# Patient Record
Sex: MALE | Race: WHITE | NOT HISPANIC OR LATINO | Employment: FULL TIME | URBAN - METROPOLITAN AREA
[De-identification: names, ages, dates, MRNs, and addresses within clinical notes are randomized per-mention and may not be internally consistent; named-entity substitution may affect disease eponyms.]

---

## 2017-05-30 ENCOUNTER — HOSPITAL ENCOUNTER (EMERGENCY)
Facility: MEDICAL CENTER | Age: 27
End: 2017-05-30
Payer: COMMERCIAL

## 2017-05-30 ENCOUNTER — APPOINTMENT (OUTPATIENT)
Dept: ADMISSIONS | Facility: MEDICAL CENTER | Age: 27
End: 2017-05-30
Attending: OTOLARYNGOLOGY
Payer: COMMERCIAL

## 2017-05-30 NOTE — OR NURSING
preadmit appointment: Pt. Instructed to continue regularly prescribed medication through the day before surgery, instructed to take the following medications, the day of surgery, with a sip of water per anesthesia protocol  No meds:

## 2017-06-02 ENCOUNTER — HOSPITAL ENCOUNTER (OUTPATIENT)
Facility: MEDICAL CENTER | Age: 27
End: 2017-06-02
Attending: OTOLARYNGOLOGY | Admitting: OTOLARYNGOLOGY
Payer: COMMERCIAL

## 2017-06-02 VITALS
DIASTOLIC BLOOD PRESSURE: 67 MMHG | HEIGHT: 71 IN | BODY MASS INDEX: 24.17 KG/M2 | RESPIRATION RATE: 16 BRPM | OXYGEN SATURATION: 93 % | TEMPERATURE: 98.6 F | SYSTOLIC BLOOD PRESSURE: 101 MMHG | WEIGHT: 172.62 LBS

## 2017-06-02 LAB — PATHOLOGY CONSULT NOTE: NORMAL

## 2017-06-02 PROCEDURE — 700105 HCHG RX REV CODE 258: Performed by: OTOLARYNGOLOGY

## 2017-06-02 PROCEDURE — 160029 HCHG SURGERY MINUTES - 1ST 30 MINS LEVEL 4: Performed by: OTOLARYNGOLOGY

## 2017-06-02 PROCEDURE — 160009 HCHG ANES TIME/MIN: Performed by: OTOLARYNGOLOGY

## 2017-06-02 PROCEDURE — 500331 HCHG COTTONOID, SURG PATTIE: Performed by: OTOLARYNGOLOGY

## 2017-06-02 PROCEDURE — A9270 NON-COVERED ITEM OR SERVICE: HCPCS

## 2017-06-02 PROCEDURE — 160041 HCHG SURGERY MINUTES - EA ADDL 1 MIN LEVEL 4: Performed by: OTOLARYNGOLOGY

## 2017-06-02 PROCEDURE — 700101 HCHG RX REV CODE 250

## 2017-06-02 PROCEDURE — 160002 HCHG RECOVERY MINUTES (STAT): Performed by: OTOLARYNGOLOGY

## 2017-06-02 PROCEDURE — 160035 HCHG PACU - 1ST 60 MINS PHASE I: Performed by: OTOLARYNGOLOGY

## 2017-06-02 PROCEDURE — 501404 HCHG SPLINT, NASAL DOYLE AIRWAY: Performed by: OTOLARYNGOLOGY

## 2017-06-02 PROCEDURE — 160036 HCHG PACU - EA ADDL 30 MINS PHASE I: Performed by: OTOLARYNGOLOGY

## 2017-06-02 PROCEDURE — 88300 SURGICAL PATH GROSS: CPT

## 2017-06-02 PROCEDURE — 502240 HCHG MISC OR SUPPLY RC 0272: Performed by: OTOLARYNGOLOGY

## 2017-06-02 PROCEDURE — 160025 RECOVERY II MINUTES (STATS): Performed by: OTOLARYNGOLOGY

## 2017-06-02 PROCEDURE — 501838 HCHG SUTURE GENERAL: Performed by: OTOLARYNGOLOGY

## 2017-06-02 PROCEDURE — 160048 HCHG OR STATISTICAL LEVEL 1-5: Performed by: OTOLARYNGOLOGY

## 2017-06-02 PROCEDURE — 160046 HCHG PACU - 1ST 60 MINS PHASE II: Performed by: OTOLARYNGOLOGY

## 2017-06-02 PROCEDURE — 700102 HCHG RX REV CODE 250 W/ 637 OVERRIDE(OP)

## 2017-06-02 PROCEDURE — 501405 HCHG SPLINT, NASAL EXTERNAL: Performed by: OTOLARYNGOLOGY

## 2017-06-02 PROCEDURE — 700111 HCHG RX REV CODE 636 W/ 250 OVERRIDE (IP)

## 2017-06-02 RX ORDER — LIDOCAINE HYDROCHLORIDE AND EPINEPHRINE 10; 10 MG/ML; UG/ML
INJECTION, SOLUTION INFILTRATION; PERINEURAL
Status: DISCONTINUED | OUTPATIENT
Start: 2017-06-02 | End: 2017-06-02 | Stop reason: HOSPADM

## 2017-06-02 RX ORDER — OXYMETAZOLINE HYDROCHLORIDE 0.05 G/100ML
SPRAY NASAL
Status: DISCONTINUED | OUTPATIENT
Start: 2017-06-02 | End: 2017-06-02 | Stop reason: HOSPADM

## 2017-06-02 RX ORDER — OXYMETAZOLINE HYDROCHLORIDE 0.05 G/100ML
SPRAY NASAL
Status: COMPLETED
Start: 2017-06-02 | End: 2017-06-02

## 2017-06-02 RX ORDER — LIDOCAINE HYDROCHLORIDE 10 MG/ML
INJECTION, SOLUTION INFILTRATION; PERINEURAL
Status: COMPLETED
Start: 2017-06-02 | End: 2017-06-02

## 2017-06-02 RX ORDER — SODIUM CHLORIDE, SODIUM LACTATE, POTASSIUM CHLORIDE, CALCIUM CHLORIDE 600; 310; 30; 20 MG/100ML; MG/100ML; MG/100ML; MG/100ML
1000 INJECTION, SOLUTION INTRAVENOUS
Status: DISCONTINUED | OUTPATIENT
Start: 2017-06-02 | End: 2017-06-02 | Stop reason: HOSPADM

## 2017-06-02 RX ADMIN — LIDOCAINE HYDROCHLORIDE 0.2 ML: 10 INJECTION, SOLUTION INFILTRATION; PERINEURAL at 07:20

## 2017-06-02 RX ADMIN — SODIUM CHLORIDE, POTASSIUM CHLORIDE, SODIUM LACTATE AND CALCIUM CHLORIDE 1000 ML: 600; 310; 30; 20 INJECTION, SOLUTION INTRAVENOUS at 07:21

## 2017-06-02 RX ADMIN — OXYMETAZOLINE HYDROCHLORIDE 2 SPRAY: 5 SPRAY NASAL at 07:27

## 2017-06-02 ASSESSMENT — PAIN SCALES - GENERAL
PAINLEVEL_OUTOF10: 2
PAINLEVEL_OUTOF10: 0
PAINLEVEL_OUTOF10: 2
PAINLEVEL_OUTOF10: 1
PAINLEVEL_OUTOF10: 0

## 2017-06-02 NOTE — IP AVS SNAPSHOT
6/2/2017    Adarsh Parra  [address]  [facility] [address]    Dear Adarsh:    [facility] wants to ensure your discharge home is safe and you or your loved ones have had all of your questions answered regarding your care after you leave the hospital.    Below is a list of resources and contact information should you have any questions regarding your hospital stay, follow-up instructions, or active medical symptoms.    Questions or Concerns Regarding… Contact   Medical Questions Related to Your Discharge  (7 days a week, 8am-5pm) Contact a Nurse Care Coordinator   [phone]   Medical Questions Not Related to Your Discharge  (24 hours a day / 7 days a week)  Contact the Nurse Health Line   [phone]    Medications or Discharge Instructions Refer to your discharge packet   or contact your [facility] Primary Care Provider   [phone]   Follow-up Appointment(s) Schedule your appointment via [employer]   or contact Scheduling [phone]   Billing Review your statement via [employer]  or contact Billing [phone]   Medical Records Review your records via [employer]   or contact Medical Records [phone]     You may receive a telephone call within two days of discharge. This call is to make certain you understand your discharge instructions and have the opportunity to have any questions answered. You can also easily access your medical information, test results and upcoming appointments via the [employer] free online health management tool. You can learn more and sign up at [employer]. For assistance setting up your [employer] account, please call [phone].    Once again, we want to ensure your discharge home is safe and that you have a clear understanding of any next steps in your care. If you have any questions or concerns, please do not hesitate to contact us, we are here for you. Thank you for choosing [facility] for your healthcare needs.    Sincerely,    Your [facility] Healthcare Team

## 2017-06-02 NOTE — DISCHARGE INSTRUCTIONS
ACTIVITY: Rest and take it easy for the first 24 hours.  A responsible adult is recommended to remain with you during that time.  It is normal to feel sleepy.  We encourage you to not do anything that requires balance, judgment or coordination.    MILD FLU-LIKE SYMPTOMS ARE NORMAL. YOU MAY EXPERIENCE GENERALIZED MUSCLE ACHES, THROAT IRRITATION, HEADACHE AND/OR SOME NAUSEA.    FOR 24 HOURS DO NOT:  Drive, operate machinery or run household appliances.  Drink beer or alcoholic beverages.   Make important decisions or sign legal documents.    SPECIAL INSTRUCTIONS: Elevate head of bed 15-30 degrees (prop up on several pillows when resting or sleeping). Ice pack to nose/eyes 20 minutes on/20 minutes off. Do not blow nose.     DIET: To avoid nausea, slowly advance diet as tolerated, avoiding spicy or greasy foods for the first day.  Add more substantial food to your diet according to your physician's instructions. INCREASE FLUIDS AND FIBER TO AVOID CONSTIPATION.    SURGICAL DRESSING/BATHING: Changes nasal drip pad as needed.     FOLLOW-UP APPOINTMENT:  A follow-up appointment should be arranged with your doctor in office as instructed; call to schedule.    You should CALL YOUR PHYSICIAN if you develop:  Fever greater than 101 degrees F.  Pain not relieved by medication, or persistent nausea or vomiting.  Excessive bleeding (blood soaking through dressing) or unexpected drainage from the wound.  Extreme redness or swelling around the incision site, drainage of pus or foul smelling drainage.  Inability to urinate or empty your bladder within 8 hours.  Problems with breathing or chest pain.    You should call 911 if you develop problems with breathing or chest pain.  If you are unable to contact your doctor or surgical center, you should go to the nearest emergency room or urgent care center.  Dr Feng's telephone #: [phone]    If any questions arise, call your doctor.  If your doctor is not available, please feel free  to call the Surgical Center at [phone].  The Center is open Monday through Friday from 7AM to 7PM.  You can also call the HEALTH HOTLINE open 24 hours/day, 7 days/week and speak to a nurse at [phone], or toll free at [phone].    A registered nurse may call you a few days after your surgery to see how you are doing after your procedure.    MEDICATIONS: Resume taking daily medication.  Take prescribed pain medication with food.  If no medication is prescribed, you may take non-aspirin pain medication if needed.  PAIN MEDICATION CAN BE VERY CONSTIPATING.  Take a stool softener or laxative such as senokot, pericolace, or milk of magnesia if needed.    Prescription given for Percocet, Zofran and Augmentin.  Last pain medication given at n/a.    If your physician has prescribed pain medication that includes Acetaminophen (Tylenol), do not take additional Acetaminophen (Tylenol) while taking the prescribed medication.  Septoplasty  Septoplasty, also known as nasal septal reconstruction or nasal septoplasty, is a procedure to straighten the wall that divides the nostrils inside the nose (septum). A misaligned, curved, or angled septum may be present at birth or could be due to an injury. If the septum is severely out of place (deviated), it may result in problems, such as difficulty breathing through the nose. The internal structures that stick out from the side walls of the nose (turbinates) into the nasal passage may also be trimmed (reduced) to help you breathe more easily.   This procedure may be done to treat:   · Deviation of the nasal septum, which may cause difficulty breathing.  · Repeated infection of the sinuses. The sinuses are air-filled cavities in the skull.  · A blood clot in the septum that interferes with your breathing.  · Frequent nosebleeds.  LET YOUR HEALTH CARE PROVIDER KNOW ABOUT:  · Any allergies you have.  · All medicines you are taking, including vitamins, herbs, eye drops,  creams, and over-the-counter medicines.  · Previous problems you or members of your family have had with the use of anesthetics.  · Any blood disorders you have.  · Previous surgeries you have had.  · Medical conditions you have.  RISKS AND COMPLICATIONS  Generally, this is a safe procedure. However, problems can occur and include:  · Allergic reaction to the medicines used during surgery.  · Infection.  · Temporary or permanent:  ¨ Loss of sensation in your upper lip or teeth.  ¨ Impaired or lost sense of taste.  · Need for additional surgery because of:  ¨ Scar tissue (adhesions) inside your nose.  ¨ The return of nasal blockage after surgery.  · A hole (perforation) in your septum.  · Increased snoring or sleep disturbances.  · Changes in the appearance of your nose. This is rare.  BEFORE THE PROCEDURE  · Ask your health care provider about:  ¨ Changing or stopping your regular medicines. This is especially important if you are taking diabetes medicines or blood thinners.  ¨ Taking medicines such as aspirin and ibuprofen. These medicines can thin your blood. Do not take these medicines before your procedure if your health care provider instructs you not to.  · Do not eat or drink anything after midnight on the night before the procedure or as directed by your health care provider. Your health care provider may have instructions about what type of meal to eat on the evening before your procedure.  PROCEDURE  · An IV tube may be inserted into a vein.  · You will be given one of the following:  ¨ A medicine that numbs the area (local anesthetic).  ¨ A medicine that makes you go to sleep (general anesthetic).  · Working through the nostrils, the surgeon will make a cut (incision) on the inner lining of the septum.  · If there is a blood clot, it will be removed.  · The bone and cartilage of the septum will be reshaped.  ¨ In some cases, part of the cartilage or bone may need to be removed.  · The turbinates may also  be reduced.  · The straightened septum will be held in place using plastic sheets or splints.  · Your nose may be packed with gauze to control the bleeding.  AFTER THE PROCEDURE  · Your blood pressure, heart rate, breathing rate, and blood oxygen level will be monitored often until the medicines you were given have worn off.  · You may be asked to breathe through your mouth.  · You may be given medicines for discomfort and nausea.  · You may be given antibiotic medicine to prevent infection.  · If nose packing was inserted, it may need to stay in place for some time to control bleeding. Follow your health care provider's instructions about when it is safe to remove the packing.     This information is not intended to replace advice given to you by your health care provider. Make sure you discuss any questions you have with your health care provider.     Document Released: 09/13/2006 Document Revised: 01/08/2016 Document Reviewed: 07/29/2015  [employer] Interactive Patient Education ©2016 [employer]    Depression / Suicide Risk    As you are discharged from this [facility] facility, it is important to learn how to keep safe from harming yourself.    Recognize the warning signs:  · Abrupt changes in personality, positive or negative- including increase in energy   · Giving away possessions  · Change in eating patterns- significant weight changes-  positive or negative  · Change in sleeping patterns- unable to sleep or sleeping all the time   · Unwillingness or inability to communicate  · Depression  · Unusual sadness, discouragement and loneliness  · Talk of wanting to die  · Neglect of personal appearance   · Rebelliousness- reckless behavior  · Withdrawal from people/activities they love  · Confusion- inability to concentrate     If you or a loved one observes any of these behaviors or has concerns about self-harm, here's what you can do:  · Talk about it- your feelings and reasons for harming yourself  · Remove  any means that you might use to hurt yourself (examples: pills, rope, extension cords, firearm)  · Get professional help from the community (Mental Health, Substance Abuse, psychological counseling)  · Do not be alone:Call your Safe Contact- someone whom you trust who will be there for you.  · Call your local CRISIS HOTLINE 838-8069 or 809-714-7790  · Call your local Children's Mobile Crisis Response Team [place] (428) 713-8988 or www.[employer]  · Call the toll free National Suicide Prevention Hotlines   · National Suicide Prevention Lifeline 768-136-PLSB (7604)  · National Hope Line Network 800-SUICIDE (251-1287)

## 2017-06-02 NOTE — OR NURSING
1030: Settled in recliner post short ambulation from [facility].  1100: D/Kenn to care of family post uneventful stay in PACU 2.

## 2017-06-02 NOTE — IP AVS SNAPSHOT
" Home Care Instructions                                                                                                                Name:Adarsh Parra  Medical Record Number:[id number]  CSN: [id number]    YOB: 1990   Age: 27 y.o.  Sex: male  HT:1.803 m (5' 11\") WT: 78.3 kg (172 lb 9.9 oz)          Admit Date: 6/2/2017     Discharge Date:   Today's Date: 6/2/2017  Attending Doctor:  Yareli Feng M.D.                  Allergies:  Seasonal              Follow-up Information     1. Follow up with Yareli Feng M.D. In 1 week.    Specialty:  Otolaryngology    Contact information    [address]  [place] [id number] [phone]          Discharge Instructions         ACTIVITY: Rest and take it easy for the first 24 hours.  A responsible adult is recommended to remain with you during that time.  It is normal to feel sleepy.  We encourage you to not do anything that requires balance, judgment or coordination.    MILD FLU-LIKE SYMPTOMS ARE NORMAL. YOU MAY EXPERIENCE GENERALIZED MUSCLE ACHES, THROAT IRRITATION, HEADACHE AND/OR SOME NAUSEA.    FOR 24 HOURS DO NOT:  Drive, operate machinery or run household appliances.  Drink beer or alcoholic beverages.   Make important decisions or sign legal documents.    SPECIAL INSTRUCTIONS: Elevate head of bed 15-30 degrees (prop up on several pillows when resting or sleeping). Ice pack to nose/eyes 20 minutes on/20 minutes off. Do not blow nose.     DIET: To avoid nausea, slowly advance diet as tolerated, avoiding spicy or greasy foods for the first day.  Add more substantial food to your diet according to your physician's instructions. INCREASE FLUIDS AND FIBER TO AVOID CONSTIPATION.    SURGICAL DRESSING/BATHING: Changes nasal drip pad as needed.     FOLLOW-UP APPOINTMENT:  A follow-up appointment should be arranged with your doctor in office as instructed; call to schedule.    You should CALL YOUR PHYSICIAN if you develop:  Fever greater than 101 degrees " F.  Pain not relieved by medication, or persistent nausea or vomiting.  Excessive bleeding (blood soaking through dressing) or unexpected drainage from the wound.  Extreme redness or swelling around the incision site, drainage of pus or foul smelling drainage.  Inability to urinate or empty your bladder within 8 hours.  Problems with breathing or chest pain.    You should call 911 if you develop problems with breathing or chest pain.  If you are unable to contact your doctor or surgical center, you should go to the nearest emergency room or urgent care center.  Dr Feng's telephone #: [phone]    If any questions arise, call your doctor.  If your doctor is not available, please feel free to call the Surgical Center at [phone].  The Center is open Monday through Friday from 7AM to 7PM.  You can also call the [employer] HOTLINE open 24 hours/day, 7 days/week and speak to a nurse at [phone], or toll free at [phone].    A registered nurse may call you a few days after your surgery to see how you are doing after your procedure.    MEDICATIONS: Resume taking daily medication.  Take prescribed pain medication with food.  If no medication is prescribed, you may take non-aspirin pain medication if needed.  PAIN MEDICATION CAN BE VERY CONSTIPATING.  Take a stool softener or laxative such as senokot, pericolace, or milk of magnesia if needed.    Prescription given for Percocet, Zofran and Augmentin.  Last pain medication given at n/a.    If your physician has prescribed pain medication that includes Acetaminophen (Tylenol), do not take additional Acetaminophen (Tylenol) while taking the prescribed medication.  Septoplasty  Septoplasty, also known as nasal septal reconstruction or nasal septoplasty, is a procedure to straighten the wall that divides the nostrils inside the nose (septum). A misaligned, curved, or angled septum may be present at birth or could be due to an injury. If the septum is severely out of  place (deviated), it may result in problems, such as difficulty breathing through the nose. The internal structures that stick out from the side walls of the nose (turbinates) into the nasal passage may also be trimmed (reduced) to help you breathe more easily.   This procedure may be done to treat:   · Deviation of the nasal septum, which may cause difficulty breathing.  · Repeated infection of the sinuses. The sinuses are air-filled cavities in the skull.  · A blood clot in the septum that interferes with your breathing.  · Frequent nosebleeds.  LET YOUR HEALTH CARE PROVIDER KNOW ABOUT:  · Any allergies you have.  · All medicines you are taking, including vitamins, herbs, eye drops, creams, and over-the-counter medicines.  · Previous problems you or members of your family have had with the use of anesthetics.  · Any blood disorders you have.  · Previous surgeries you have had.  · Medical conditions you have.  RISKS AND COMPLICATIONS  Generally, this is a safe procedure. However, problems can occur and include:  · Allergic reaction to the medicines used during surgery.  · Infection.  · Temporary or permanent:  ¨ Loss of sensation in your upper lip or teeth.  ¨ Impaired or lost sense of taste.  · Need for additional surgery because of:  ¨ Scar tissue (adhesions) inside your nose.  ¨ The return of nasal blockage after surgery.  · A hole (perforation) in your septum.  · Increased snoring or sleep disturbances.  · Changes in the appearance of your nose. This is rare.  BEFORE THE PROCEDURE  · Ask your health care provider about:  ¨ Changing or stopping your regular medicines. This is especially important if you are taking diabetes medicines or blood thinners.  ¨ Taking medicines such as aspirin and ibuprofen. These medicines can thin your blood. Do not take these medicines before your procedure if your health care provider instructs you not to.  · Do not eat or drink anything after midnight on the night before the  procedure or as directed by your health care provider. Your health care provider may have instructions about what type of meal to eat on the evening before your procedure.  PROCEDURE  · An IV tube may be inserted into a vein.  · You will be given one of the following:  ¨ A medicine that numbs the area (local anesthetic).  ¨ A medicine that makes you go to sleep (general anesthetic).  · Working through the nostrils, the surgeon will make a cut (incision) on the inner lining of the septum.  · If there is a blood clot, it will be removed.  · The bone and cartilage of the septum will be reshaped.  ¨ In some cases, part of the cartilage or bone may need to be removed.  · The turbinates may also be reduced.  · The straightened septum will be held in place using plastic sheets or splints.  · Your nose may be packed with gauze to control the bleeding.  AFTER THE PROCEDURE  · Your blood pressure, heart rate, breathing rate, and blood oxygen level will be monitored often until the medicines you were given have worn off.  · You may be asked to breathe through your mouth.  · You may be given medicines for discomfort and nausea.  · You may be given antibiotic medicine to prevent infection.  · If nose packing was inserted, it may need to stay in place for some time to control bleeding. Follow your health care provider's instructions about when it is safe to remove the packing.     This information is not intended to replace advice given to you by your health care provider. Make sure you discuss any questions you have with your health care provider.     Document Released: 09/13/2006 Document Revised: 01/08/2016 Document Reviewed: 07/29/2015  [employer] Interactive Patient Education ©2016 [employer].    Depression / Suicide Risk    As you are discharged from this [facility] facility, it is important to learn how to keep safe from harming yourself.    Recognize the warning signs:  · Abrupt changes in personality, positive or  negative- including increase in energy   · Giving away possessions  · Change in eating patterns- significant weight changes-  positive or negative  · Change in sleeping patterns- unable to sleep or sleeping all the time   · Unwillingness or inability to communicate  · Depression  · Unusual sadness, discouragement and loneliness  · Talk of wanting to die  · Neglect of personal appearance   · Rebelliousness- reckless behavior  · Withdrawal from people/activities they love  · Confusion- inability to concentrate     If you or a loved one observes any of these behaviors or has concerns about self-harm, here's what you can do:  · Talk about it- your feelings and reasons for harming yourself  · Remove any means that you might use to hurt yourself (examples: pills, rope, extension cords, firearm)  · Get professional help from the community (Mental Health, Substance Abuse, psychological counseling)  · Do not be alone:Call your Safe Contact- someone whom you trust who will be there for you.  · Call your local CRISIS HOTLINE 987-2784 or 043-282-4614  · Call your local Children's Mobile Crisis Response Team [place] (382) 186-9899 or wwwSocitive  · Call the toll free National Suicide Prevention Hotlines   · National Suicide Prevention Lifeline 375-989-JEGE (5691)  · National Hope Line Network 800-SUICIDE (232-7942)       Medication List      Notice     You have not been prescribed any medications.            Medication Information     Above and/or attached are the medications your physician expects you to take upon discharge. Review all of your home medications and newly ordered medications with your doctor and/or pharmacist. Follow medication instructions as directed by your doctor and/or pharmacist. Please keep your medication list with you and share with your physician. Update the information when medications are discontinued, doses are changed, or new medications (including over-the-counter products) are  added; and carry medication information at all times in the event of emergency situations.        Resources     Quit Smoking / Tobacco Use:    I understand the use of any tobacco products increases my chance of suffering from future heart disease or stroke and could cause other illnesses which may shorten my life. Quitting the use of tobacco products is the single most important thing I can do to improve my health. For further information on smoking / tobacco cessation call a Toll Free Quit Line at 1-124.293.2202 (*National Cancer [place]) or 1-135.198.6814 (American Lung Association) or you can access the web based program at www.lungusa.org.    [place] Tobacco Users Help Line:  (648) 218-7742       Toll Free: 1-890.527.4778  Quit Tobacco Program [facility] (319)874-9490    Crisis Hotline:    [facility] Crisis Hotline:  7-469-VNXQCOR or 1-672.331.8541    [place] Crisis Hotline:    1-609.899.8012 or 644-324-8848    Discharge Survey:   Thank you for choosing [facility]. We hope we did everything we could to make your hospital stay a pleasant one. You may be receiving a survey and we would appreciate your time and participation in answering the questions. Your input is very valuable to us in our efforts to improve our service to our patients and their families.            Signatures     My signature on this form indicates that:    1. I acknowledge receipt and understanding of these Home Care Instruction.  2. My questions regarding this information have been answered to my satisfaction.  3. I have formulated a plan with my discharge nurse to obtain my prescribed medications for home.    __________________________________      __________________________________                   Patient Signature                                 Guardian/Responsible Adult Signature      __________________________________                 __________       ________                       Nurse Signature                                                Date                 Time

## 2017-06-02 NOTE — OR NURSING
"0924 To PACU from OR via gurney, side rails up x 2 for safety, lungs clear bilaterally, scds on patient and machine operational, nasal drip pad dressing CDI to nose and ET tube removed now by Dr Yanez; breathing remains easy and unlabored. HOB elevated to 30 degrees and ice pack placed over eyes/nose.   0935 Pt arouses easily to voice; small, serosanguinous drainage noted to oral cavity. Given sip of water; denies nausea. Denies pain. Eyes close quickly without stimulation.   0950 Removed ice pack to assess vision; pt denies double vision or any vision changes. Denies pain or nausea. Ice pack replaced over eyes/nose.   1000 Report to Jaja, RN  1015 Pt reports \"tiny bit of pain\" to nose and denies nausea. Tolerating sips of water. Nasal drip pad CDI. Ice pack removed at this time.   1030 Meets criteria for transfer to stage II.   "

## 2017-06-02 NOTE — OP REPORT
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    PROCEDURE PERFORMED:  Septal rhinoplasty with revision turbinoplasty.    SURGEON:  Yareli Feng MD    ANESTHESIOLOGIST:  Ron Yanez MD    ANESTHESIA:  General.    NARRATIVE:  After meeting the patient in the preoperative holding area,   reviewing risks, benefits, complications, and alternatives, reviewing the   planned surgical course, the patient was taken to the operating room and   placed under satisfactory general anesthesia.  Eyes were covered.  Face was   prepped with a dilute Betadine solution.  Nose was injected with 1% Xylocaine   with epinephrine 1:200,000.  Patient had a complete nasal obstruction on the   right hand side, so the Afrin pledgets would not fit on the right and only one   of them would fit on the left.  This was because of the nature of the   transverse bone and cartilage and the septum and the dislodgement of the nasal   bone on the right.  After that had been injected with as much local as   possible, I left to scrub.  Upon returning, the patient was then sterilely   draped.  After that had been completed, I then commenced the surgery.    Right hemitransfixion incision was completed on the right side.  Anterior and   posterior tunnels were developed over a corrugated quadrangular cartilage and   bone and then a left hemitransfixion incision was made to do the similar.  We   did not do a transverse incision cut through because of the patient's severe   fracture had dislodged the quadrangular cartilage off the anterior edge of the   septal spine.  Utilizing as much of the flaps were elevated as possible, it   was noted that there was more than likely a septal hematoma which had lead to   cartilaginous resorption  in the interseptal envelope and there was erosion of   the mucosa.  I elevated the mucosa as best as I could minimizing the empty   space there and then removed the quadrangular cartilage, lateralized pieces of   bone and cartilage and set it aside for later usage.  Along the floor of the   nasal cavity, a double action rongeur and a chisel was used to remove   lateralized bone which had been displaced and healed to the palate.  After   that was completely suctioned out, there was a small cut made, 2 mm cut inside   the nasal cavity above the head of the inferior turbinate in order to do an   osteotomy.  A small osteotomy was made with a outfracture utilizing an   elevator, that moved the bone back out of the way and then the intranasal   portion of the exam was much clear.  I then coblated the soft tissue and   further outfractured any turbinate tissue on the right, left hand side,   suctioned up the nasal cavity and then reconstituted the interseptal envelope   after closing the mucosal perforation.  That area of the perforation noted I   used a whipstitch with a 4-0 chromic to close it and then placed in the   cartilage which was remaining.  Admittedly, there was not a full cartilage to   replace it because there had been resorption, but I reconstructed the anterior   septal strut, sutured that in the midline and then closed with a   hemitransfixion incision on the right hemitransfixion and the left   hemitransfixion.  Two Shrestha nasal splints were placed in and sutured with a   2-0 nylon in order to keep the wall evacuated of blood and to keep it midline.    The nasal cavity was suctioned out and 2 large Staley packs were placed on   each side to allow for additional pressure.  The patient was suctioned out,   returned to anesthesia, awakened, extubated, and transferred to recovery.    ESTIMATED BLOOD LOSS:  About 15 mL.    DRAINS USED:  None.       ____________________________________     SOTO MOYER,  MD REECE / MARTINEZ    DD:  06/02/2017 09:41:28  DT:  06/02/2017 10:55:13    D#:  [id number]  Job#:  [id number]

## (undated) DEVICE — PROTECTOR ULNA NERVE - (36PR/CA)

## (undated) DEVICE — SPONGE GAUZE STER 4X4 8-PL - (2/PK 50PK/BX 12BX/CS)

## (undated) DEVICE — SUTURE GENERAL

## (undated) DEVICE — SYRINGE 10 ML CONTROL LL (25EA/BX 4BX/CA)

## (undated) DEVICE — DRAPE SURGICAL U 77X120 - (10/CA)

## (undated) DEVICE — SUTURE 4-0 CHROMIC GUT - 18 INCH G-3 D/A (12/BX)

## (undated) DEVICE — SPLINT NASAL DOYLE AIRWAY (2EA/ST)

## (undated) DEVICE — NEEDLE NON SAFETY 25 GA X 1 1/2 IN HYPO (100EA/BX)

## (undated) DEVICE — SUTURE 0 SILK CT-1 (36PK/BX)

## (undated) DEVICE — NEPTUNE 4 PORT MANIFOLD - (20/PK)

## (undated) DEVICE — GLOVE BIOGEL PI ORTHO SZ 8 PF LF (40PR/BX)

## (undated) DEVICE — PATTIES SURG X-RAYCOTTONOID - 1/2 X 3 IN (200/CA)

## (undated) DEVICE — CORDS BIPOLAR COAGULATION - 12FT STERILE DISP. (10EA/BX)

## (undated) DEVICE — WAND TURBINATE REFLEX ULTRA 45 1.3MM

## (undated) DEVICE — SUTURE 2-0 ETHILON FS - (36/BX) 18 INCH

## (undated) DEVICE — NEEDLE NON SAFETY 27GA X 1-1/4 IN HYPO (100EA/BX)

## (undated) DEVICE — GLOVE BIOGEL PI ULTRATOUCH SZ 8.0 SURGICAL PF LF - (50/BX 4BX/CA)

## (undated) DEVICE — GLOVE BIOGEL SZ 8 SURGICAL PF LTX - (50PR/BX 4BX/CA)

## (undated) DEVICE — ELECTRODE DUAL RETURN W/ CORD - (50/PK)

## (undated) DEVICE — SUTURE 3-0 ETHILON FS-1 - (36/BX) 30 INCH

## (undated) DEVICE — GLOVE BIOGEL PI ULTRATOUCH SZ 8.5 SURGICAL PF LF - (200PR/CA)

## (undated) DEVICE — TUBE E-T HI-LO CUFF 7.5MM (10EA/PK)

## (undated) DEVICE — GLOVE, LITE (PAIR)

## (undated) DEVICE — KIT ROOM DECONTAMINATION

## (undated) DEVICE — Device

## (undated) DEVICE — PACK MINOR BASIN - (2EA/CA)

## (undated) DEVICE — LACTATED RINGERS INJ 1000 ML - (14EA/CA 60CA/PF)

## (undated) DEVICE — SPONGE GAUZESTER. 2X2 4-PL - (2/PK 50PK/BX 30BX/CS)

## (undated) DEVICE — CONTAINER SPECIMEN BAG OR - STERILE 4 OZ W/LID (100EA/CA)

## (undated) DEVICE — BLADE SURGICAL #15 - (50/BX 3BX/CA)

## (undated) DEVICE — GLOVE SURGICAL PROTEXIS PI 8.0 LF - (50PR/BX)

## (undated) DEVICE — SLEEVE, SEQUENTIAL CALF REG

## (undated) DEVICE — SODIUM CHL IRRIGATION 0.9% 1000ML (12EA/CA)